# Patient Record
Sex: FEMALE | URBAN - METROPOLITAN AREA
[De-identification: names, ages, dates, MRNs, and addresses within clinical notes are randomized per-mention and may not be internally consistent; named-entity substitution may affect disease eponyms.]

---

## 2021-07-01 ENCOUNTER — TELEPHONE (OUTPATIENT)
Dept: HEMATOLOGY/ONCOLOGY | Facility: CLINIC | Age: 46
End: 2021-07-01

## 2024-10-18 DIAGNOSIS — R10.13 EPIGASTRIC PAIN: Primary | ICD-10-CM

## 2024-10-21 NOTE — H&P
Name MICK HUIZAR (48yo, F)       1975         Insurance Med Primary: [facility] (MEDICAID REPLACEMENT - HMO)  Insurance # : [id number]  Policy/Group # : [id number]  Prescription: [employer] - Member is eligible. details  Prescription: [employer] - Member is eligible. details     Chief Complaint  Constipation, Abdominal pain  Patient's Care Team  Primary Care Provider: XOCHITL SHERMAN: [address] [place] [address], Ph [phone], Fax [phone] NPI: [id number]   Patient's Pharmacies  [id number] ([place]): [address] [place] [address], Ph [phone], Fax [phone]   Vitals    T: 97 F° temporal artery 10/17/2024 09:26 am BP: 124/91 sitting 10/17/2024 09:27 am Pulse: 76 bpm regular 10/17/2024 09:27 am   O2Sat: 99% Room Air at Rest 10/17/2024 09:25 am Ht: 5 ft 3 in Stated 10/17/2024 09:26 am Wt: 240 lbs With clothes 10/17/2024 09:05 am   BMI: 42.5 10/17/2024 09:26 am       Allergies    Reviewed Allergies   NKDA   Medications    Reviewed Medications    Biktarvy 50 mg-200 mg-25 mg tablet  TAKE ONE TABLET BY MOUTH EVERY DAY 10/14/24   filled surescripts    mg tablet  TAKE ONE TABLET BY MOUTH EVERY 8 HOURS with meals AS NEEDED FOR PAIN 24   filled surescripts   linaCLOtide 72 mcg capsule  Take 1 capsule(s) every day by oral route for 30 days. 10/17/24   prescribed Cassi Hwang NP   metFORMIN 1,000 mg tablet  TAKE ONE TABLET BY MOUTH TWICE DAILY with meals 24   filled surescripts   Ozempic 2 mg/dose (8 mg/3 mL) subcutaneous pen injector  INJECT 2MG SUBCUTANEOUSLY EVERY WEEK 24   filled surescripts   Problems  Reviewed Problems   Family History  Reviewed Family History  Paternal Aunt - Malignant tumor of breast     - Malignant tumor of breast   DM, HTN   Social History  Reviewed Social History   Activities of Daily Living  Are you able to care for yourself?: Yes  Are  you blind or do you have difficulty seeing?: No  Are you deaf or do you have serious difficulty hearing? : No  Do you have difficulty concentrating, remembering or making decisions?: No  Do you have difficulty walking or climbing stairs?: No  Substance Use  Do you or have you ever smoked tobacco?: Former smoker  How many years have you smoked tobacco?: 10  What is your level of alcohol consumption?: None  Public Health and Travel  In the 14 days before symptom onset, have you had close contact with a laboratory-confirmed COVID-19 while that case was ill?: No  In the 14 days before symptom onset, have you had close contact with a person who is under investigation for COVID-19 while that person was ill?: No  Diet and Exercise  What type of diet are you following?: Regular    Surgical History  Reviewed Surgical History  EGD -  wnl per patient  Colonoscopy - 2019  [facility]   Nerve block   [facility]  Past [facility] History  Reviewed Past Medical History  HIV/AIDS: Y - hiv  Diabetes: Y   Screening  None recorded.  HPI  Patient comes with abdominal pain which began one month ago, symptoms are similar to 2019 when diagnosed with lymphoma. She is also having constipation which began six months ago, notes taking laxative to produce a bm after 30 hours. She does note she is eating fungi, honey crisp or gala helps to have a BM daily.     Will need to get previous Hemo/onlocolgy records from Dr Desire Sparks,     Patient with a history of non-hodgkins lymphoma of stomach since  in which she was [facility]. She was last seen in  and is trying to re-establish care in [place].    EGD 2019- non bleeding 6 cm ulcer stomach, esophagus wnl, duodenum wnl path malignant lymphoma gastric/stomach bx (Diffuse large B cell lymphoma  EGD  wnl, per patient. No records sent  Colon 2019- diverticulosis repeat colon in       CT Chest 10/2020  several 2 mm nodular densities within the lungs  CT ABD/Pelvis Fatty liver, gallstones in the gallbladder, No definite findings of abnormal adenopathy in the abd/pelvis, indeterminate right adrenal nodule        ROS  ROS as noted in the HPI  Physical Exam  Constitutional: General Appearance: healthy-appearing, well-nourished, and well-developed. Level of Distress: NAD. Ambulation: ambulating normally.     Psychiatric: Insight: good judgement. Mental Status: normal mood and affect and active and alert. Orientation: to time, place, and person. Memory: recent memory normal and remote memory normal.     Head: Head: normocephalic and atraumatic.     Eyes: Lids and Conjunctivae: no discharge or pallor and non-injected. Pupils: PERRLA. Corneas: grossly intact. EOM: EOMI. Lens: clear. Sclerae: non-icteric.     ENMT: Ears: no lesions on external ear. Hearing: no hearing loss. Nose: no lesions on external nose or nasal discharge and nasal passages clear. Oropharynx: moist mucous membranes.     Neck: Neck: supple, FROM, trachea midline, and no masses. Thyroid: no enlargement.     Lungs: Respiratory effort: no dyspnea. Percussion: no dullness, flatness, or hyperresonance. Auscultation: no wheezing, rales/crackles, or rhonchi and breath sounds normal, good air movement, and CTA except as noted.     Cardiovascular: Apical Impulse: not displaced. Heart Auscultation: normal S1 and S2; no murmurs, rubs, or gallops; and RRR.     Abdomen: Bowel Sounds: normal. Inspection and Palpation: no tenderness, guarding, masses, rebound tenderness, or CVA tenderness and soft and non-distended. Liver: non-tender and no hepatomegaly. Spleen: non-tender and no splenomegaly. Hernia: none palpable.     Musculoskeletal:: Motor Strength and Tone: normal tone and motor strength. Joints, Bones, and Muscles: normal movement of all extremities. Extremities: no cyanosis or edema.     Neurologic: Gait and Station: normal gait and station. Cranial Nerves:  "grossly intact.     Skin: Inspection and palpation: no rash, lesions, ulcer, induration, nodules, jaundice, or abnormal nevi and good turgor.  Assessment / Plan  Problem list:  1. abd pain x 1 month - Epigastric pain is "weird" and feels like a hunger pain even if she had just eaten. She stays on a meal and snack schedule. This just happened once, 3 weeks ago. However, she reports that it is similar to when she was diagnosed with lymphoma on EGD. The EGD also showed a 6 cm non bleeding ulcer in the stomach.     2. Chronic constipation - will take a laxative if she has not had a BM in a day. She will normally have a type 1-2 stool or type 5-6. It is rare that she will have type 4. She reports lactose intolerance. Constipation is improving since increasing to eating 2 apples a day and eating better.     3. h/o non-hodgkins lymphoma of stomach diagnosed in 2019. She was treated at [facility] and [facility]. She was last seen in 2022 and is trying to re-establish care in [place].     4. weight loss - intentional. She was 275# and went down to 235#, but has gained some since she has had some stressors and being away from home.     5. HIV        Records from referral:     EGD 07/2019 - non bleeding 6 cm ulcer stomach, esophagus wnl, duodenum wnl path malignant lymphoma gastric/stomach bx (Diffuse large B cell lymphoma  COLONOSCOPY - 07/2019- diverticulosis repeat colon in 2029  CT Chest 10/2020 several 2 mm nodular densities within the lungs  CT ABD/Pelvis Fatty liver, gallstones in the gallbladder, No definite findings of abnormal adenopathy in the abd/pelvis, indeterminate right adrenal nodule     Plan:  1. Schedule EGD. Will need to stop ozempic 2 weeks before procedure  2. continue high fiber diet  3. linzess 72 mcg daily          1. Epigastric pain  R10.13: Epigastric pain  ESOPHAGOGASTRODUODENOSCOPY WITH BIOPSY (PROC)     2. Chronic idiopathic constipation  K59.04: Chronic idiopathic " constipation  linaclotide 72 mcg capsule -   Take 1 capsule(s) every day by oral route for 30 days.     Qty: (30)  capsule     Refills: 0     Pharmacy: [id number]     3. Human immunodeficiency virus infection  B20: Human immunodeficiency virus [HIV] disease  ACQUIRED IMMUNODEFICIENCY SYNDROME (AIDS): CARE INSTRUCTIONS  MEDICAL RECORD REQUEST* -       Note to Provider: please send results of recent CD 4 count     4. History of non-Hodgkins lymphoma  Z85.72: Personal history of non-Hodgkin lymphomas     Patient Instructions  1. Schedule EGD. Will need to stop ozempic 2 weeks before procedure  2. continue high fiber diet  3. linzess 72 mcg daily         Discussion Notes  RISK OF EGD DISCUSSED, INCLUDING RISK OF BLEEDING, INFECTION, TEARS OR PERFORATIONS REQUIRING SURGERY, ALLERGIC REACTION TO MEDICATIONS AND SIDE EFFECTS OF ANESTHESIA (CARDIOPULMONARY ARREST).  THE PT AGREES TO PROCEED.

## 2024-10-23 RX ORDER — BICTEGRAVIR SODIUM, EMTRICITABINE, AND TENOFOVIR ALAFENAMIDE FUMARATE 50; 200; 25 MG/1; MG/1; MG/1
1 TABLET ORAL
COMMUNITY
Start: 2024-10-14

## 2024-10-23 RX ORDER — SEMAGLUTIDE 1.34 MG/ML
INJECTION, SOLUTION SUBCUTANEOUS
COMMUNITY
Start: 2024-10-17

## 2024-10-23 RX ORDER — METFORMIN HYDROCHLORIDE 1000 MG/1
1000 TABLET ORAL 2 TIMES DAILY
COMMUNITY
Start: 2024-10-17

## 2024-10-23 RX ORDER — BUPROPION HYDROCHLORIDE 150 MG/1
150 TABLET, FILM COATED, EXTENDED RELEASE ORAL 2 TIMES DAILY
COMMUNITY
Start: 2024-10-17

## 2024-10-23 RX ORDER — CALCIUM CITRATE/VITAMIN D3 200MG-6.25
1 TABLET ORAL 4 TIMES DAILY
COMMUNITY
Start: 2024-09-17

## 2024-10-23 RX ORDER — IBUPROFEN 800 MG/1
800 TABLET ORAL EVERY 8 HOURS
COMMUNITY
Start: 2024-10-17

## 2024-10-23 RX ORDER — INSULIN GLARGINE 100 [IU]/ML
INJECTION, SOLUTION SUBCUTANEOUS
COMMUNITY
Start: 2024-10-17

## 2024-10-28 ENCOUNTER — ANESTHESIA EVENT (OUTPATIENT)
Dept: ENDOSCOPY | Facility: HOSPITAL | Age: 49
End: 2024-10-28
Payer: MEDICAID

## 2024-10-30 ENCOUNTER — HOSPITAL ENCOUNTER (OUTPATIENT)
Facility: HOSPITAL | Age: 49
Discharge: HOME | End: 2024-10-30
Attending: INTERNAL MEDICINE | Admitting: INTERNAL MEDICINE
Payer: MEDICAID

## 2024-10-30 ENCOUNTER — ANESTHESIA (OUTPATIENT)
Dept: ENDOSCOPY | Facility: HOSPITAL | Age: 49
End: 2024-10-30
Payer: MEDICAID

## 2024-10-30 LAB — POCT GLUCOSE: 90 MG/DL (ref 70–110)

## 2024-10-30 PROCEDURE — 37000008 HC ANESTHESIA 1ST 15 MINUTES: Performed by: INTERNAL MEDICINE

## 2024-10-30 PROCEDURE — 63600175 PHARM REV CODE 636 W HCPCS: Performed by: NURSE ANESTHETIST, CERTIFIED REGISTERED

## 2024-10-30 PROCEDURE — 82962 GLUCOSE BLOOD TEST: CPT | Performed by: INTERNAL MEDICINE

## 2024-10-30 PROCEDURE — 43239 EGD BIOPSY SINGLE/MULTIPLE: CPT | Performed by: INTERNAL MEDICINE

## 2024-10-30 PROCEDURE — 37000009 HC ANESTHESIA EA ADD 15 MINS: Performed by: INTERNAL MEDICINE

## 2024-10-30 PROCEDURE — 27201012 HC FORCEPS, HOT/COLD, DISP: Performed by: INTERNAL MEDICINE

## 2024-10-30 PROCEDURE — 88305 TISSUE EXAM BY PATHOLOGIST: CPT | Mod: 26,,, | Performed by: PATHOLOGY

## 2024-10-30 PROCEDURE — 88305 TISSUE EXAM BY PATHOLOGIST: CPT | Performed by: PATHOLOGY

## 2024-10-30 RX ORDER — FENTANYL CITRATE 50 UG/ML
INJECTION, SOLUTION INTRAMUSCULAR; INTRAVENOUS
Status: DISCONTINUED | OUTPATIENT
Start: 2024-10-30 | End: 2024-10-30

## 2024-10-30 RX ORDER — LIDOCAINE HYDROCHLORIDE 20 MG/ML
INJECTION, SOLUTION EPIDURAL; INFILTRATION; INTRACAUDAL; PERINEURAL
Status: DISCONTINUED | OUTPATIENT
Start: 2024-10-30 | End: 2024-10-30

## 2024-10-30 RX ORDER — PROPOFOL 10 MG/ML
VIAL (ML) INTRAVENOUS
Status: DISCONTINUED | OUTPATIENT
Start: 2024-10-30 | End: 2024-10-30

## 2024-10-30 RX ADMIN — FENTANYL CITRATE 25 MCG: 50 INJECTION, SOLUTION INTRAMUSCULAR; INTRAVENOUS at 01:10

## 2024-10-30 RX ADMIN — PROPOFOL 50 MG: 10 INJECTION, EMULSION INTRAVENOUS at 01:10

## 2024-10-30 RX ADMIN — SODIUM CHLORIDE, POTASSIUM CHLORIDE, SODIUM LACTATE AND CALCIUM CHLORIDE: 600; 310; 30; 20 INJECTION, SOLUTION INTRAVENOUS at 01:10

## 2024-10-30 RX ADMIN — PROPOFOL 100 MG: 10 INJECTION, EMULSION INTRAVENOUS at 01:10

## 2024-10-30 RX ADMIN — LIDOCAINE HYDROCHLORIDE 100 MG: 20 INJECTION, SOLUTION EPIDURAL; INFILTRATION; INTRACAUDAL; PERINEURAL at 01:10

## 2024-10-30 NOTE — Clinical Note
October 28, 2024         [address] [place] [address]  Phone: [phone]  Fax: [phone]       Patient: Heather Riley   YOB: 1975  Date of Visit: 10/28/2024    To Whom It May Concern:    Flaco Riley  was at [facility] on 10/28/2024. The patient may return to work/school on *** {With/no:33953} restrictions. If you have any questions or concerns, or if I can be of further assistance, please do not hesitate to contact me.    Sincerely,    Griselda Bess LPN

## 2024-10-30 NOTE — PROVATION PATIENT INSTRUCTIONS
Discharge Summary/Instructions after an Endoscopic Procedure  Patient Name: Heather Riley  Patient MRN: [id number]  Patient YOB: 1975 Wednesday, October 30, 2024  Talon Graham MD  Dear patient,  As a result of recent federal legislation (The Federal Cures Act), you may   receive lab or pathology results from your procedure in your [facility]   account before your physician is able to contact you. Your physician or   their representative will relay the results to you with their   recommendations at their soonest availability.  Thank you,  RESTRICTIONS:  During your procedure today, you received medications for sedation.  These   medications may affect your judgment, balance and coordination.  Therefore,   for 24 hours, you have the following restrictions:   - DO NOT drive a car, operate machinery, make legal/financial decisions,   sign important papers or drink alcohol.    ACTIVITY:  Today: no heavy lifting, straining or running due to procedural   sedation/anesthesia.  The following day: return to full activity including work.  DIET:  Eat and drink normally unless instructed otherwise.     TREATMENT FOR COMMON SIDE EFFECTS:  - Mild abdominal pain, nausea, belching, bloating or excessive gas:  rest,   eat lightly and use a heating pad.  - Sore Throat: treat with throat lozenges and/or gargle with warm salt   water.  - Because air was used during the procedure, expelling large amounts of air   from your rectum or belching is normal.  - If a bowel prep was taken, you may not have a bowel movement for 1-3 days.    This is normal.  SYMPTOMS TO WATCH FOR AND REPORT TO YOUR PHYSICIAN:  1. Abdominal pain or bloating, other than gas cramps.  2. Chest pain.  3. Back pain.  4. Signs of infection such as: chills or fever occurring within 24 hours   after the procedure.  5. Rectal bleeding, which would show as bright red, maroon, or black stools.   (A tablespoon of blood from the rectum is not serious,  especially if   hemorrhoids are present.)  6. Vomiting.  7. Weakness or dizziness.  GO DIRECTLY TO THE NEAREST EMERGENCY ROOM IF YOU HAVE ANY OF THE FOLLOWING:      Difficulty breathing              Chills and/or fever over 101 F   Persistent vomiting and/or vomiting blood   Severe abdominal pain   Severe chest pain   Black, tarry stools   Bleeding- more than one tablespoon   Any other symptom or condition that you feel may need urgent attention  Your doctor recommends these additional instructions:  If any biopsies were taken, your doctors clinic will contact you in 1 to 2   weeks with any results.  - Await pathology results.   - Return to my office in 2 weeks.   - Discharge patient to home (ambulatory).  For questions, problems or results please call your physician Talon Graham MD at Work:  [phone]  If you have any questions about the above instructions, call the GI   department at [phone] or call the endoscopy unit at [phone]   from 7am until 3 pm.  [facility], EMERGENCY ROOM PHONE NUMBER:   [phone]  IF A COMPLICATION OR EMERGENCY SITUATION ARISES AND YOU ARE UNABLE TO REACH   YOUR PHYSICIAN - GO DIRECTLY TO THE EMERGENCY ROOM.  I have read or have had read to me these discharge instructions for my   procedure and have received a written copy.  I understand these   instructions and will follow-up with my physician if I have any questions.     __________________________________       _____________________________________  Nurse Signature                                          Patient/Designated   Responsible Party Signature  MD Talon Campa MD  10/30/2024 1:57:29 PM  This report has been verified and signed electronically.  Dear patient,  As a result of recent federal legislation (The Federal Cures Act), you may   receive lab or pathology results from your procedure in your [facility]   account before your physician is able to  contact you. Your physician or   their representative will relay the results to you with their   recommendations at their soonest availability.  Thank you,  PROVATION

## 2024-10-30 NOTE — LETTER
Heather Riley  [facility] [address] [place] [address]        You are scheduled for a/an EGD (Procedure), on Wednesday (Day), 10/30/24 (Date).     Please arrive at 12:00 pm. You should of stopped your ozempic 7 days prior to day of procedure. If you have any questions, please feel free to reach out to us at [phone].    Thanks,  Endoscopy Scheduling

## 2024-10-30 NOTE — INTERVAL H&P NOTE
The patient has been examined and the H&P has been reviewed:  I concur with the findings and no changes have occurred since H&P was written.    Patient cleared for Anesthesia: MAC    Anesthesia/Surgery risks, benefits and alternative options discussed and understood by patient/family.    There are no hospital problems to display for this patient.

## 2024-10-31 VITALS
HEART RATE: 82 BPM | BODY MASS INDEX: 42.54 KG/M2 | RESPIRATION RATE: 20 BRPM | HEIGHT: 63 IN | TEMPERATURE: 97 F | OXYGEN SATURATION: 98 % | DIASTOLIC BLOOD PRESSURE: 69 MMHG | WEIGHT: 240.06 LBS | SYSTOLIC BLOOD PRESSURE: 110 MMHG

## 2024-11-04 LAB
FINAL PATHOLOGIC DIAGNOSIS: NORMAL
GROSS: NORMAL
Lab: NORMAL